# Patient Record
Sex: MALE | Race: WHITE | NOT HISPANIC OR LATINO
[De-identification: names, ages, dates, MRNs, and addresses within clinical notes are randomized per-mention and may not be internally consistent; named-entity substitution may affect disease eponyms.]

---

## 2021-07-13 ENCOUNTER — NON-APPOINTMENT (OUTPATIENT)
Age: 46
End: 2021-07-13

## 2021-07-14 ENCOUNTER — NON-APPOINTMENT (OUTPATIENT)
Age: 46
End: 2021-07-14

## 2021-07-15 ENCOUNTER — APPOINTMENT (OUTPATIENT)
Dept: SURGERY | Facility: CLINIC | Age: 46
End: 2021-07-15
Payer: COMMERCIAL

## 2021-07-15 VITALS
HEIGHT: 71 IN | TEMPERATURE: 97.6 F | SYSTOLIC BLOOD PRESSURE: 130 MMHG | HEART RATE: 59 BPM | DIASTOLIC BLOOD PRESSURE: 79 MMHG | OXYGEN SATURATION: 98 % | BODY MASS INDEX: 32.2 KG/M2 | WEIGHT: 230 LBS

## 2021-07-15 DIAGNOSIS — Z78.9 OTHER SPECIFIED HEALTH STATUS: ICD-10-CM

## 2021-07-15 DIAGNOSIS — Z86.69 PERSONAL HISTORY OF OTHER DISEASES OF THE NERVOUS SYSTEM AND SENSE ORGANS: ICD-10-CM

## 2021-07-15 DIAGNOSIS — Z80.8 FAMILY HISTORY OF MALIGNANT NEOPLASM OF OTHER ORGANS OR SYSTEMS: ICD-10-CM

## 2021-07-15 DIAGNOSIS — Z86.39 PERSONAL HISTORY OF OTHER ENDOCRINE, NUTRITIONAL AND METABOLIC DISEASE: ICD-10-CM

## 2021-07-15 PROCEDURE — 99072 ADDL SUPL MATRL&STAF TM PHE: CPT

## 2021-07-15 PROCEDURE — 99204 OFFICE O/P NEW MOD 45 MIN: CPT

## 2021-07-15 RX ORDER — VENLAFAXINE HCL 50 MG
TABLET ORAL
Refills: 0 | Status: ACTIVE | COMMUNITY

## 2021-07-15 RX ORDER — SIMVASTATIN 80 MG/1
TABLET, FILM COATED ORAL
Refills: 0 | Status: ACTIVE | COMMUNITY

## 2021-07-15 NOTE — ASSESSMENT
[FreeTextEntry1] : All risks, benefits and options discussed including open vs laparoscopic hernia repair. Pt wants to schedule a laparoscopic repair of his inguinal hernia

## 2021-07-15 NOTE — PHYSICAL EXAM
[JVD] : no jugular venous distention  [Normal Thyroid] : the thyroid was normal [Carotid Bruits] : no carotid bruits [Normal Breath Sounds] : Normal breath sounds [Normal Heart Sounds] : normal heart sounds [Normal Rate and Rhythm] : normal rate and rhythm [Stool Sample Taken] : A stool sample was obtained  [Occult Blood Positive] : was negative for occult blood [Gross Blood] : no gross blood [Fecal Impaction] : no fecal impaction [No Rash or Lesion] : No rash or lesion [Alert] : alert [Oriented to Person] : oriented to person [Oriented to Place] : oriented to place [Oriented to Time] : oriented to time [Calm] : calm [de-identified] : well developed white male in no distress [de-identified] : noninjected and nonicteric [de-identified] : without adenopathy [de-identified] : normal bowel sounds, without distension or tenderness [de-identified] : normal testicles, right inguinal hernia, increased size left ring [de-identified] : without masses or tenderness [de-identified] : without calf pain or swelling

## 2021-07-15 NOTE — REVIEW OF SYSTEMS
[Suicidal] : not suicidal [Sleep Disturbances] : no sleep disturbances [Anxiety] : anxiety [Depression] : no depression [Change In Personality] : no personality change [Emotional Problems] : no emotional problems [Negative] : Heme/Lymph [FreeTextEntry9] : has back pain

## 2021-07-15 NOTE — HISTORY OF PRESENT ILLNESS
[de-identified] : right inguinal hernia [de-identified] : 46 year old white male who presents c/o a right inguinal hernia. Pt states that over the last three months he has noticed a lump that comes and goes. He has had mild discomfort in the bertha. He denies any nausea or vomiting or changes in his bowel habits. Denies urinary symptoms.

## 2021-07-19 ENCOUNTER — OUTPATIENT (OUTPATIENT)
Dept: OUTPATIENT SERVICES | Facility: HOSPITAL | Age: 46
LOS: 1 days | End: 2021-07-19
Payer: COMMERCIAL

## 2021-07-19 VITALS
DIASTOLIC BLOOD PRESSURE: 74 MMHG | RESPIRATION RATE: 14 BRPM | HEIGHT: 71 IN | WEIGHT: 220.02 LBS | HEART RATE: 60 BPM | TEMPERATURE: 97 F | OXYGEN SATURATION: 97 % | SYSTOLIC BLOOD PRESSURE: 137 MMHG

## 2021-07-19 DIAGNOSIS — Z98.890 OTHER SPECIFIED POSTPROCEDURAL STATES: Chronic | ICD-10-CM

## 2021-07-19 DIAGNOSIS — K40.90 UNILATERAL INGUINAL HERNIA, WITHOUT OBSTRUCTION OR GANGRENE, NOT SPECIFIED AS RECURRENT: ICD-10-CM

## 2021-07-19 DIAGNOSIS — Z41.9 ENCOUNTER FOR PROCEDURE FOR PURPOSES OTHER THAN REMEDYING HEALTH STATE, UNSPECIFIED: Chronic | ICD-10-CM

## 2021-07-19 DIAGNOSIS — Z01.818 ENCOUNTER FOR OTHER PREPROCEDURAL EXAMINATION: ICD-10-CM

## 2021-07-19 LAB
ANION GAP SERPL CALC-SCNC: 4 MMOL/L — LOW (ref 5–17)
APPEARANCE UR: CLEAR — SIGNIFICANT CHANGE UP
BILIRUB UR-MCNC: NEGATIVE — SIGNIFICANT CHANGE UP
BUN SERPL-MCNC: 27 MG/DL — HIGH (ref 7–23)
CALCIUM SERPL-MCNC: 9.6 MG/DL — SIGNIFICANT CHANGE UP (ref 8.5–10.1)
CHLORIDE SERPL-SCNC: 106 MMOL/L — SIGNIFICANT CHANGE UP (ref 96–108)
CO2 SERPL-SCNC: 32 MMOL/L — HIGH (ref 22–31)
COLOR SPEC: YELLOW — SIGNIFICANT CHANGE UP
CREAT SERPL-MCNC: 1 MG/DL — SIGNIFICANT CHANGE UP (ref 0.5–1.3)
DIFF PNL FLD: NEGATIVE — SIGNIFICANT CHANGE UP
GLUCOSE SERPL-MCNC: 76 MG/DL — SIGNIFICANT CHANGE UP (ref 70–99)
GLUCOSE UR QL: NEGATIVE — SIGNIFICANT CHANGE UP
HCT VFR BLD CALC: 39 % — SIGNIFICANT CHANGE UP (ref 39–50)
HGB BLD-MCNC: 13.1 G/DL — SIGNIFICANT CHANGE UP (ref 13–17)
KETONES UR-MCNC: NEGATIVE — SIGNIFICANT CHANGE UP
LEUKOCYTE ESTERASE UR-ACNC: NEGATIVE — SIGNIFICANT CHANGE UP
MCHC RBC-ENTMCNC: 29.4 PG — SIGNIFICANT CHANGE UP (ref 27–34)
MCHC RBC-ENTMCNC: 33.6 GM/DL — SIGNIFICANT CHANGE UP (ref 32–36)
MCV RBC AUTO: 87.6 FL — SIGNIFICANT CHANGE UP (ref 80–100)
NITRITE UR-MCNC: NEGATIVE — SIGNIFICANT CHANGE UP
NRBC # BLD: 0 /100 WBCS — SIGNIFICANT CHANGE UP (ref 0–0)
PH UR: 5 — SIGNIFICANT CHANGE UP (ref 5–8)
PLATELET # BLD AUTO: 173 K/UL — SIGNIFICANT CHANGE UP (ref 150–400)
POTASSIUM SERPL-MCNC: 3.8 MMOL/L — SIGNIFICANT CHANGE UP (ref 3.5–5.3)
POTASSIUM SERPL-SCNC: 3.8 MMOL/L — SIGNIFICANT CHANGE UP (ref 3.5–5.3)
PROT UR-MCNC: NEGATIVE — SIGNIFICANT CHANGE UP
RBC # BLD: 4.45 M/UL — SIGNIFICANT CHANGE UP (ref 4.2–5.8)
RBC # FLD: 12 % — SIGNIFICANT CHANGE UP (ref 10.3–14.5)
SODIUM SERPL-SCNC: 142 MMOL/L — SIGNIFICANT CHANGE UP (ref 135–145)
SP GR SPEC: 1.02 — SIGNIFICANT CHANGE UP (ref 1.01–1.02)
UROBILINOGEN FLD QL: NEGATIVE — SIGNIFICANT CHANGE UP
WBC # BLD: 7.63 K/UL — SIGNIFICANT CHANGE UP (ref 3.8–10.5)
WBC # FLD AUTO: 7.63 K/UL — SIGNIFICANT CHANGE UP (ref 3.8–10.5)

## 2021-07-19 PROCEDURE — G0463: CPT

## 2021-07-19 PROCEDURE — 93010 ELECTROCARDIOGRAM REPORT: CPT

## 2021-07-19 PROCEDURE — 93005 ELECTROCARDIOGRAM TRACING: CPT

## 2021-07-19 PROCEDURE — 80048 BASIC METABOLIC PNL TOTAL CA: CPT

## 2021-07-19 PROCEDURE — 86901 BLOOD TYPING SEROLOGIC RH(D): CPT

## 2021-07-19 PROCEDURE — 87086 URINE CULTURE/COLONY COUNT: CPT

## 2021-07-19 PROCEDURE — 81003 URINALYSIS AUTO W/O SCOPE: CPT

## 2021-07-19 PROCEDURE — 86900 BLOOD TYPING SEROLOGIC ABO: CPT

## 2021-07-19 PROCEDURE — 85027 COMPLETE CBC AUTOMATED: CPT

## 2021-07-19 PROCEDURE — 36415 COLL VENOUS BLD VENIPUNCTURE: CPT

## 2021-07-19 PROCEDURE — 86850 RBC ANTIBODY SCREEN: CPT

## 2021-07-19 RX ORDER — CHOLECALCIFEROL (VITAMIN D3) 125 MCG
1 CAPSULE ORAL
Qty: 0 | Refills: 0 | DISCHARGE

## 2021-07-19 RX ORDER — OMEGA-3 ACID ETHYL ESTERS 1 G
1 CAPSULE ORAL
Qty: 0 | Refills: 0 | DISCHARGE

## 2021-07-19 NOTE — H&P PST ADULT - NSICDXPASTSURGICALHX_GEN_ALL_CORE_FT
PAST SURGICAL HISTORY:  Elective surgery 1980's Procedure to remove excessive blood vessels from groin    H/O shoulder surgery 2015 - Repair Torn Labrum RIGHT SHoulder    History of back surgery 2008 - Herniated Disc

## 2021-07-19 NOTE — H&P PST ADULT - NEGATIVE ENMT SYMPTOMS
no hearing difficulty/no sinus symptoms/no nasal congestion/no abnormal taste sensation/no dry mouth/no throat pain/no dysphagia

## 2021-07-19 NOTE — H&P PST ADULT - NSICDXPROBLEM_GEN_ALL_CORE_FT
PROBLEM DIAGNOSES  Problem: Unilateral inguinal hernia, without obstruction or gangrene, not specified as recurrent  Assessment and Plan: PST Labs; CBC, BMP, Type & Screen, U.A, Urine Culture, EKG. Medical clearance with PCP Dr Marie Rhodes.Pt has called office brendan appointment, waiting for them to call back with date and time this week. Pt notes he will call office himself again this morning to finalize appointment date and time. Pt instructed to stop any NSAIDS/Herbal Supplements between now and procedure. May take Tylenol if needed for pain between now and procedure. Morning of procedure pt will take his Effexor with small sip of water. Pt has received J&J COVID-19 vaccine therefore he dose not need to have a covid swab prior to procedure. Pre-op instructions as well as pre-op wash instructions given to pt with understanding verbalized. All questions addressed with patient prior to him leaving the PST department.

## 2021-07-19 NOTE — H&P PST ADULT - NSICDXPASTMEDICALHX_GEN_ALL_CORE_FT
PAST MEDICAL HISTORY:  Anxiety     Hypercholesterolemia     GEO on CPAP     Unilateral inguinal hernia, without obstruction or gangrene, not specified as recurrent

## 2021-07-19 NOTE — H&P PST ADULT - PSYCHIATRIC DETAILS
How Severe Are Your Spot(S)?: mild
Have Your Spot(S) Been Treated In The Past?: has not been treated
Hpi Title: Evaluation of Skin Lesions
Location: Left thigh
Year Removed: 2014
normal affect/normal behavior

## 2021-07-19 NOTE — H&P PST ADULT - NSICDXFAMILYHX_GEN_ALL_CORE_FT
FAMILY HISTORY:  Mother  Still living? Yes, Estimated age: 71-80  Family history of malignant melanoma, Age at diagnosis: Age Unknown

## 2021-07-19 NOTE — H&P PST ADULT - ASSESSMENT
46 year old male PMH Hypercholesterolemia, GEO (on CPAP), Anxiety; now with Unilateral Inguinal hernia, without obstruction or gangrene, not specified as recurrent; scheduled for Laparoscopic Repair RIGHT Inguinal hernia on 7/26/2021 with Dr Vic Villarreal.

## 2021-07-19 NOTE — H&P PST ADULT - HISTORY OF PRESENT ILLNESS
46 year old male PMH Hypercholesterolemia, GEO (on CPAP), Anxiety; now with Unilateral Inguinal hernia, without obstruction or gangrene, not specified as recurrent; presents today for PST prior to Laparoscopic Repair RIGHT Inguinal hernia on 7/26/2021 with Dr Vic Villarreal. Pt reports he initially noted the hernia this spring. Notes "lump in right groin and I lye down to push it back in." (Reducible hernia). Denies any N/V, denies any changes in bowel or bladder habits. Pt was seen by his PCP for his annual and it was advised he see a general surgeon. Pt was seen by Dr Vic Villarreal and following consult, exam and discussions regarding treatment options pt is electing for scheduled procedure.    46 year old male PMH Hypercholesterolemia, GEO (on CPAP), Anxiety; now with Unilateral Inguinal hernia, without obstruction or gangrene, not specified as recurrent; presents today for PST prior to Laparoscopic Repair RIGHT Inguinal hernia on 7/26/2021 with Dr Vic Villarreal. Pt reports he initially noted the hernia this spring. Notes "lump in right groin and I lye down to push it back in." (Reducible hernia). Reports he notes it when he is standing for long periods of time and it doing and if doing certain activities. Denies pain at rest however pain with activity or standing for a period of time can become #8/10. Denies use of any pain medication for pain management. Denies any N/V, denies any changes in bowel or bladder habits. Pt was seen by his PCP for his annual and it was advised he see a general surgeon. Pt was seen by Dr Vic Villarreal and following consult, exam and discussions regarding treatment options pt is electing for scheduled procedure.

## 2021-07-20 LAB
CULTURE RESULTS: SIGNIFICANT CHANGE UP
SPECIMEN SOURCE: SIGNIFICANT CHANGE UP

## 2021-07-23 NOTE — ASU PATIENT PROFILE, ADULT - PMH
Anxiety    Hypercholesterolemia    GEO on CPAP    Unilateral inguinal hernia, without obstruction or gangrene, not specified as recurrent

## 2021-07-23 NOTE — ASU PATIENT PROFILE, ADULT - PSH
Elective surgery  1980's Procedure to remove excessive blood vessels from groin  H/O shoulder surgery  2015 - Repair Torn Labrum RIGHT [place]  History of back surgery  2008 - Herniated Disc

## 2021-07-25 ENCOUNTER — TRANSCRIPTION ENCOUNTER (OUTPATIENT)
Age: 46
End: 2021-07-25

## 2021-07-26 ENCOUNTER — OUTPATIENT (OUTPATIENT)
Dept: OUTPATIENT SERVICES | Facility: HOSPITAL | Age: 46
LOS: 1 days | End: 2021-07-26
Payer: COMMERCIAL

## 2021-07-26 ENCOUNTER — APPOINTMENT (OUTPATIENT)
Dept: SURGERY | Facility: HOSPITAL | Age: 46
End: 2021-07-26

## 2021-07-26 ENCOUNTER — RESULT REVIEW (OUTPATIENT)
Age: 46
End: 2021-07-26

## 2021-07-26 VITALS
OXYGEN SATURATION: 99 % | DIASTOLIC BLOOD PRESSURE: 67 MMHG | TEMPERATURE: 98 F | RESPIRATION RATE: 15 BRPM | SYSTOLIC BLOOD PRESSURE: 119 MMHG | HEART RATE: 65 BPM

## 2021-07-26 VITALS
SYSTOLIC BLOOD PRESSURE: 125 MMHG | TEMPERATURE: 98 F | DIASTOLIC BLOOD PRESSURE: 67 MMHG | HEART RATE: 52 BPM | OXYGEN SATURATION: 96 % | RESPIRATION RATE: 16 BRPM

## 2021-07-26 DIAGNOSIS — Z41.9 ENCOUNTER FOR PROCEDURE FOR PURPOSES OTHER THAN REMEDYING HEALTH STATE, UNSPECIFIED: Chronic | ICD-10-CM

## 2021-07-26 DIAGNOSIS — Z98.890 OTHER SPECIFIED POSTPROCEDURAL STATES: Chronic | ICD-10-CM

## 2021-07-26 DIAGNOSIS — K40.90 UNILATERAL INGUINAL HERNIA, WITHOUT OBSTRUCTION OR GANGRENE, NOT SPECIFIED AS RECURRENT: ICD-10-CM

## 2021-07-26 DIAGNOSIS — Z01.818 ENCOUNTER FOR OTHER PREPROCEDURAL EXAMINATION: ICD-10-CM

## 2021-07-26 PROCEDURE — 55520 REMOVAL OF SPERM CORD LESION: CPT

## 2021-07-26 PROCEDURE — 49650 LAP ING HERNIA REPAIR INIT: CPT | Mod: RT

## 2021-07-26 PROCEDURE — 49203: CPT | Mod: 59

## 2021-07-26 PROCEDURE — 49203: CPT | Mod: AS

## 2021-07-26 PROCEDURE — C1781: CPT

## 2021-07-26 PROCEDURE — 88304 TISSUE EXAM BY PATHOLOGIST: CPT | Mod: 26

## 2021-07-26 PROCEDURE — C1889: CPT

## 2021-07-26 PROCEDURE — 88304 TISSUE EXAM BY PATHOLOGIST: CPT

## 2021-07-26 PROCEDURE — 49650 LAP ING HERNIA REPAIR INIT: CPT | Mod: AS

## 2021-07-26 RX ORDER — DOCUSATE SODIUM 100 MG
1 CAPSULE ORAL
Qty: 20 | Refills: 0
Start: 2021-07-26

## 2021-07-26 RX ORDER — ONDANSETRON 8 MG/1
4 TABLET, FILM COATED ORAL ONCE
Refills: 0 | Status: COMPLETED | OUTPATIENT
Start: 2021-07-26 | End: 2021-07-26

## 2021-07-26 RX ORDER — HYDROMORPHONE HYDROCHLORIDE 2 MG/ML
0.5 INJECTION INTRAMUSCULAR; INTRAVENOUS; SUBCUTANEOUS
Refills: 0 | Status: DISCONTINUED | OUTPATIENT
Start: 2021-07-26 | End: 2021-07-26

## 2021-07-26 RX ORDER — IBUPROFEN 200 MG
1 TABLET ORAL
Qty: 20 | Refills: 0
Start: 2021-07-26

## 2021-07-26 RX ORDER — OMEGA-3 ACID ETHYL ESTERS 1 G
2 CAPSULE ORAL
Qty: 0 | Refills: 0 | DISCHARGE

## 2021-07-26 RX ORDER — TAMSULOSIN HYDROCHLORIDE 0.4 MG/1
0.4 CAPSULE ORAL ONCE
Refills: 0 | Status: COMPLETED | OUTPATIENT
Start: 2021-07-26 | End: 2021-07-26

## 2021-07-26 RX ORDER — VENLAFAXINE HCL 75 MG
1 CAPSULE, EXT RELEASE 24 HR ORAL
Qty: 0 | Refills: 0 | DISCHARGE

## 2021-07-26 RX ORDER — OXYCODONE AND ACETAMINOPHEN 5; 325 MG/1; MG/1
1 TABLET ORAL
Qty: 20 | Refills: 0
Start: 2021-07-26

## 2021-07-26 RX ORDER — CEFAZOLIN SODIUM 1 G
2000 VIAL (EA) INJECTION ONCE
Refills: 0 | Status: COMPLETED | OUTPATIENT
Start: 2021-07-26 | End: 2021-07-26

## 2021-07-26 RX ORDER — SODIUM CHLORIDE 9 MG/ML
1000 INJECTION, SOLUTION INTRAVENOUS
Refills: 0 | Status: DISCONTINUED | OUTPATIENT
Start: 2021-07-26 | End: 2021-07-26

## 2021-07-26 RX ORDER — SIMVASTATIN 20 MG/1
1 TABLET, FILM COATED ORAL
Qty: 0 | Refills: 0 | DISCHARGE

## 2021-07-26 RX ORDER — CHOLECALCIFEROL (VITAMIN D3) 125 MCG
2 CAPSULE ORAL
Qty: 0 | Refills: 0 | DISCHARGE

## 2021-07-26 RX ORDER — OXYCODONE HYDROCHLORIDE 5 MG/1
5 TABLET ORAL ONCE
Refills: 0 | Status: DISCONTINUED | OUTPATIENT
Start: 2021-07-26 | End: 2021-07-26

## 2021-07-26 RX ORDER — SODIUM CHLORIDE 9 MG/ML
250 INJECTION INTRAMUSCULAR; INTRAVENOUS; SUBCUTANEOUS ONCE
Refills: 0 | Status: COMPLETED | OUTPATIENT
Start: 2021-07-26 | End: 2021-07-26

## 2021-07-26 RX ADMIN — TAMSULOSIN HYDROCHLORIDE 0.4 MILLIGRAM(S): 0.4 CAPSULE ORAL at 09:27

## 2021-07-26 RX ADMIN — SODIUM CHLORIDE 250 MILLILITER(S): 9 INJECTION INTRAMUSCULAR; INTRAVENOUS; SUBCUTANEOUS at 09:27

## 2021-07-26 RX ADMIN — ONDANSETRON 4 MILLIGRAM(S): 8 TABLET, FILM COATED ORAL at 10:01

## 2021-07-26 RX ADMIN — HYDROMORPHONE HYDROCHLORIDE 0.5 MILLIGRAM(S): 2 INJECTION INTRAMUSCULAR; INTRAVENOUS; SUBCUTANEOUS at 10:19

## 2021-07-26 RX ADMIN — HYDROMORPHONE HYDROCHLORIDE 0.5 MILLIGRAM(S): 2 INJECTION INTRAMUSCULAR; INTRAVENOUS; SUBCUTANEOUS at 10:04

## 2021-07-26 RX ADMIN — SODIUM CHLORIDE 75 MILLILITER(S): 9 INJECTION, SOLUTION INTRAVENOUS at 07:20

## 2021-07-26 NOTE — BRIEF OPERATIVE NOTE - NSICDXBRIEFPROCEDURE_GEN_ALL_CORE_FT
PROCEDURES:  Laparoscopic inguinal hernia repair 26-Jul-2021 09:16:54 right groin Juan Manuel Moura

## 2021-07-26 NOTE — ASU DISCHARGE PLAN (ADULT/PEDIATRIC) - ASU DC SPECIAL INSTRUCTIONSFT
You may remove the clear adhesive and gauze dressings and shower in 48 hours.  Do not remove steri-strips.  Do not let water hit wounds directly, do not rub incisions.      No heavy lifting (nothing heavier than 5 lbs.), no strenuous physical activity, no exercise.      You may perform your usual daily tasks as tolerated.    You may resume your usual daily diet as tolerated.           Do not drive for 24 hours following anesthesia.  Do not drive while taking narcotic pain meds.  Do not drive until pain-free.      Keep your legs elevated as much as possible (with your feet 18-20 inches above your heart) to help prevent pain/throbbing/swelling at surgical site.     Take percocet as prescribed for pain.  Take colace as prescribed while taking narcotic pain medication to prevent constipation.  Take ibuprofen as prescribed for moderate pain - take with food.      Follow-up with Dr. Villarreal in his office next week - call office for appointment if not already made.

## 2021-07-26 NOTE — BRIEF OPERATIVE NOTE - NSICDXBRIEFPREOP_GEN_ALL_CORE_FT
PRE-OP DIAGNOSIS:  Unilateral inguinal hernia, without obstruction or gangrene, not specified as recurrent 26-Jul-2021 09:17:19  Juan Manuel Moura

## 2021-07-26 NOTE — BRIEF OPERATIVE NOTE - NSICDXBRIEFPOSTOP_GEN_ALL_CORE_FT
POST-OP DIAGNOSIS:  Unilateral inguinal hernia, without obstruction or gangrene, not specified as recurrent 26-Jul-2021 09:17:25  Juan Manuel Moura

## 2021-07-26 NOTE — BRIEF OPERATIVE NOTE - OPERATION/FINDINGS
Adhesions of peritoneum in the region of the right inguinal hernia to small bowel and right colon - meticulously taken down.

## 2021-07-26 NOTE — ASU DISCHARGE PLAN (ADULT/PEDIATRIC) - CARE PROVIDER_API CALL
Vic Villarreal)  Surgery  [address] [place] [id number]  Phone: [phone]  Fax: [phone]  Follow Up Time:

## 2021-07-27 PROBLEM — F41.9 ANXIETY DISORDER, UNSPECIFIED: Chronic | Status: ACTIVE | Noted: 2021-07-19

## 2021-07-27 PROBLEM — G47.33 OBSTRUCTIVE SLEEP APNEA (ADULT) (PEDIATRIC): Chronic | Status: ACTIVE | Noted: 2021-07-19

## 2021-07-27 PROBLEM — K40.90 UNILATERAL INGUINAL HERNIA, WITHOUT OBSTRUCTION OR GANGRENE, NOT SPECIFIED AS RECURRENT: Chronic | Status: ACTIVE | Noted: 2021-07-19

## 2021-07-27 PROBLEM — E78.00 PURE HYPERCHOLESTEROLEMIA, UNSPECIFIED: Chronic | Status: ACTIVE | Noted: 2021-07-19

## 2021-07-27 LAB — SURGICAL PATHOLOGY STUDY: SIGNIFICANT CHANGE UP

## 2021-07-29 ENCOUNTER — APPOINTMENT (OUTPATIENT)
Dept: SURGERY | Facility: CLINIC | Age: 46
End: 2021-07-29
Payer: COMMERCIAL

## 2021-07-29 VITALS — TEMPERATURE: 97.2 F

## 2021-07-29 PROCEDURE — 99024 POSTOP FOLLOW-UP VISIT: CPT

## 2021-07-29 NOTE — PHYSICAL EXAM
[Normal Breath Sounds] : Normal breath sounds [Normal Heart Sounds] : normal heart sounds [Normal Rate and Rhythm] : normal rate and rhythm [Calm] : calm [de-identified] : well developed male in no acute distress [de-identified] : normal bowel sounds, without distension or tenderness\par Incision clean and closed [de-identified] : repair intact without swelling [de-identified] : without calf pain or swelling

## 2021-07-29 NOTE — HISTORY OF PRESENT ILLNESS
[de-identified] : s/p laparoscopic repair of right inguinal hernia on 7/26/21 [de-identified] : Pt with soreness and constipation, no fevers or chills. Slowly improving

## 2021-08-05 ENCOUNTER — NON-APPOINTMENT (OUTPATIENT)
Age: 46
End: 2021-08-05

## 2021-08-19 ENCOUNTER — APPOINTMENT (OUTPATIENT)
Dept: SURGERY | Facility: CLINIC | Age: 46
End: 2021-08-19
Payer: COMMERCIAL

## 2021-08-19 VITALS — TEMPERATURE: 98.2 F

## 2021-08-19 DIAGNOSIS — K40.90 UNILATERAL INGUINAL HERNIA, W/OUT OBSTRUCTION OR GANGRENE, NOT SPECIFIED AS RECURRENT: ICD-10-CM

## 2021-08-19 PROCEDURE — 99024 POSTOP FOLLOW-UP VISIT: CPT

## 2021-08-19 NOTE — PHYSICAL EXAM
[Normal Breath Sounds] : Normal breath sounds [Normal Rate and Rhythm] : normal rate and rhythm [No Rash or Lesion] : No rash or lesion [Calm] : calm [de-identified] : well developed white male in no distress. [de-identified] : noninjected and nonicteric [de-identified] : normal bowel sounds, without distension or tenderness.\par Incisions clean and closed. [de-identified] : repair intact, without swelling [de-identified] : without calf pain or swelling

## 2021-08-19 NOTE — HISTORY OF PRESENT ILLNESS
[de-identified] : s/p laparoscopic repair of right inguinal hernia on 7/26/21 [de-identified] : pt improved, decreased pain, normal GI functioning, Denies any fevers or chills, voiding without difficulty. Pt does have testicular sensitivity but it is improving.

## 2022-03-10 ENCOUNTER — APPOINTMENT (OUTPATIENT)
Dept: SURGERY | Facility: CLINIC | Age: 47
End: 2022-03-10
Payer: COMMERCIAL

## 2022-03-10 VITALS
HEART RATE: 65 BPM | BODY MASS INDEX: 30.8 KG/M2 | SYSTOLIC BLOOD PRESSURE: 147 MMHG | WEIGHT: 220 LBS | OXYGEN SATURATION: 97 % | DIASTOLIC BLOOD PRESSURE: 87 MMHG | HEIGHT: 71 IN | TEMPERATURE: 97.2 F

## 2022-03-10 PROCEDURE — 99212 OFFICE O/P EST SF 10 MIN: CPT

## 2022-03-10 NOTE — PHYSICAL EXAM
[Normal Breath Sounds] : Normal breath sounds [Normal Heart Sounds] : normal heart sounds [Normal Rate and Rhythm] : normal rate and rhythm [Calm] : calm [de-identified] : well developed white male in no acute distress [de-identified] : normal bowel sounds, without distension or tenderness.\par Incisions clean and closed. [de-identified] : right sided repair intact without lumps or swelling [de-identified] : without calf pain or swelling

## 2022-03-10 NOTE — HISTORY OF PRESENT ILLNESS
[de-identified] : s/p laparoscopic repair of right inguinal hernia on 7/26/21 [de-identified] : pt was doing well until December when he got covid and developed right groin soreness. His pain did not resolve. He does not have any lumps in his right groin.

## 2022-04-07 ENCOUNTER — APPOINTMENT (OUTPATIENT)
Dept: SURGERY | Facility: CLINIC | Age: 47
End: 2022-04-07
Payer: COMMERCIAL

## 2022-04-07 VITALS — TEMPERATURE: 97.2 F

## 2022-04-07 PROCEDURE — 99212 OFFICE O/P EST SF 10 MIN: CPT

## 2022-04-07 RX ORDER — MELOXICAM 15 MG/1
15 TABLET ORAL DAILY
Qty: 30 | Refills: 3 | Status: ACTIVE | COMMUNITY
Start: 2022-04-07 | End: 1900-01-01

## 2022-04-07 NOTE — HISTORY OF PRESENT ILLNESS
[de-identified] : s/p laparoscopic repair of right inguinal hernia on 7/26/21 [de-identified] : Pts pain has decreased. He has normal GI functioning and no fevers or chills.

## 2022-04-07 NOTE — PHYSICAL EXAM
[Normal Breath Sounds] : Normal breath sounds [Normal Heart Sounds] : normal heart sounds [Calm] : calm [de-identified] : well developed male in no distress [de-identified] : benign [de-identified] : repair in tact, without swelling [de-identified] : without calf pain or swelling

## 2022-05-26 ENCOUNTER — APPOINTMENT (OUTPATIENT)
Dept: SURGERY | Facility: CLINIC | Age: 47
End: 2022-05-26
Payer: COMMERCIAL

## 2022-05-26 VITALS — TEMPERATURE: 97.9 F

## 2022-05-26 DIAGNOSIS — R10.31 RIGHT LOWER QUADRANT PAIN: ICD-10-CM

## 2022-05-26 PROCEDURE — 99212 OFFICE O/P EST SF 10 MIN: CPT

## 2022-05-26 NOTE — HISTORY OF PRESENT ILLNESS
[de-identified] : s/p laparoscopic repair of right inguinal hernia on 7/26/21 [de-identified] : pt had increased pain and was started on NSAID on his last visit. Pt states he is improved, He denies any swelling in his groin, Normal GI functioning,.

## 2022-05-26 NOTE — PHYSICAL EXAM
[Normal Breath Sounds] : Normal breath sounds [Normal Heart Sounds] : normal heart sounds [Normal Rate and Rhythm] : normal rate and rhythm [Calm] : calm [de-identified] : well developed white male in no distress [de-identified] : normal bowel sounds, without distension or tenderness.\par Incisions clean and closed [de-identified] : repair intact, without swelling [de-identified] : without calf pain or swelling

## 2023-04-25 NOTE — ASU PATIENT PROFILE, ADULT - PAIN CHRONIC, PROFILE
Spoke to patient, states he did blood work this morning. Updated remind me to be sure we get results.   no

## 2024-05-26 ENCOUNTER — EMERGENCY (EMERGENCY)
Facility: HOSPITAL | Age: 49
LOS: 1 days | Discharge: HOME | End: 2024-05-26
Attending: EMERGENCY MEDICINE | Admitting: EMERGENCY MEDICINE
Payer: COMMERCIAL

## 2024-05-26 VITALS
SYSTOLIC BLOOD PRESSURE: 133 MMHG | OXYGEN SATURATION: 99 % | DIASTOLIC BLOOD PRESSURE: 84 MMHG | HEART RATE: 66 BPM | WEIGHT: 229.94 LBS | TEMPERATURE: 98 F | RESPIRATION RATE: 18 BRPM

## 2024-05-26 VITALS
RESPIRATION RATE: 16 BRPM | HEART RATE: 62 BPM | OXYGEN SATURATION: 97 % | DIASTOLIC BLOOD PRESSURE: 82 MMHG | TEMPERATURE: 99 F | SYSTOLIC BLOOD PRESSURE: 136 MMHG

## 2024-05-26 DIAGNOSIS — Z98.890 OTHER SPECIFIED POSTPROCEDURAL STATES: Chronic | ICD-10-CM

## 2024-05-26 DIAGNOSIS — Z41.9 ENCOUNTER FOR PROCEDURE FOR PURPOSES OTHER THAN REMEDYING HEALTH STATE, UNSPECIFIED: Chronic | ICD-10-CM

## 2024-05-26 PROCEDURE — 12011 RPR F/E/E/N/L/M 2.5 CM/<: CPT

## 2024-05-26 PROCEDURE — 99282 EMERGENCY DEPT VISIT SF MDM: CPT | Mod: 25

## 2024-05-26 PROCEDURE — 99284 EMERGENCY DEPT VISIT MOD MDM: CPT | Mod: 25

## 2024-05-26 RX ORDER — BACITRACIN ZINC 500 UNIT/G
1 OINTMENT IN PACKET (EA) TOPICAL ONCE
Refills: 0 | Status: COMPLETED | OUTPATIENT
Start: 2024-05-26 | End: 2024-05-26

## 2024-05-26 RX ORDER — LIDOCAINE HCL 20 MG/ML
10 VIAL (ML) INJECTION ONCE
Refills: 0 | Status: COMPLETED | OUTPATIENT
Start: 2024-05-26 | End: 2024-05-26

## 2024-05-26 RX ORDER — POVIDONE-IODINE 5 %
1 AEROSOL (ML) TOPICAL ONCE
Refills: 0 | Status: COMPLETED | OUTPATIENT
Start: 2024-05-26 | End: 2024-05-26

## 2024-05-26 RX ADMIN — Medication 1 APPLICATION(S): at 12:07

## 2024-05-26 RX ADMIN — Medication 10 MILLILITER(S): at 12:08

## 2024-05-26 RX ADMIN — Medication 1 APPLICATION(S): at 12:08

## 2024-05-26 NOTE — ED PROVIDER NOTE - DIFFERENTIAL DIAGNOSIS
Differential Diagnosis Differentials include but not limited to laceration, abrasion.  No evidence of midline border involvement.  No evidence of dental injury or jaw injury
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
## 2024-05-26 NOTE — ED PROVIDER NOTE - PATIENT PORTAL LINK FT
You can access the FollowMyHealth Patient Portal offered by [facility] by registering at the following website: [email or web address]. By joining [facility]’s FollowMyHealth portal, you will also be able to view your health information using other applications (apps) compatible with our system.

## 2024-05-26 NOTE — ED PROVIDER NOTE - NSFOLLOWUPINSTRUCTIONS_ED_ALL_ED_FT
Apply thin layer of bacitracin 1-2 times a day  Sutures will dissolve on their own in 1 to 2 weeks      Mouth Laceration  A mouth laceration is a deep cut inside your mouth. The cut may go into your lip or go all the way through your mouth and cheek. The cut may also affect your tongue, the insides of your cheeks, or the upper surface of your mouth (palate). Mouth lacerations may bleed a lot.    What are the causes?  This injury often happens when your teeth cut the lining of your mouth. It may also result from an injury to your face.    What are the signs or symptoms?  Bleeding. This is the most common symptom.  Pain.  Feeling a hole or tear in your mouth.  How is this treated?  Small cuts in the mouth may not need treatment if bleeding has stopped.  Stitches (sutures) may be needed for cuts that are large or deep.  Stitches may be needed for cuts that keep bleeding.  You may also receive antibiotic medicine or a tetanus shot.  Follow these instructions at home:  Medicines    Take over-the-counter and prescription medicines only as told by your doctor.  If you were given an antibiotic, take or apply it as told by your doctor. Do not stop using the antibiotic even if you start to feel better.  Ask your doctor if you should avoid driving or using machines while you are taking your medicine.  Eating and drinking    A diet of soft foods, including applesauce, yogurt, ice cream, and a smoothie.  Eat a soft diet.  Avoid hot foods and hot liquids for a few days as told by your doctor.  Rinse your mouth after eating.  Wound care    It is normal to have a white or gray patch over your wound while it heals.  Check your wound every day for signs of infection. Check for:  Redness, swelling, or pain.  Fluid or blood.  Warmth.  Pus or a bad smell.  Gently gargle and rinse your mouth 4 to 6 times a day. Spit out the liquid. Do not swallow.  Use the rinse solution as told by your doctor. The most used types of rinse include:  A rinse that kills bacteria (chlorhexidine).  Saline rinse.  Do not poke the stitches with your tongue. Doing that can loosen them.  If you have a cut on your lip:  Keep the wound fully dry for the first 24 hours, or as told by your doctor. After that time, you may take a shower or a bath. Do not soak in water until after the stitches have been taken out.  If you were given a bandage, change it at least once a day, or as told by your doctor. You should also change it if it gets wet or dirty.  Clean the wound once a day, or as told by your doctor.  After you clean the wound, put a thin layer of antibiotic ointment on it as told by your doctor. This ointment:  Helps to prevent infection.  Keeps the bandage from sticking to the wound.  General instructions    Using a toothbrush to brush the front and back sides of the teeth.  Keep your mouth and teeth clean. Gently brush your teeth with a soft toothbrush 2 times a day.  Do not smoke or use any products that contain nicotine or tobacco. If you need help quitting, ask your doctor.  Keep all follow-up visits.  Contact a doctor if:  Medicine does not help your pain.  You have a fever or chills.  You have redness, swelling, or pain on your wound that is getting worse.  You have fresh bleeding or pus coming from your wound.  You have swollen or tender glands in your throat.  Get help right away if:  The edges of your wound break open.  Your face or the area under your jaw gets swollen.  You have trouble breathing or swallowing.  These symptoms may be an emergency. Get help right away. Call 911.  Do not wait to see if the symptoms will go away.  Do not drive yourself to the hospital.  Summary  A mouth laceration is a deep cut inside your mouth.  Mouth lacerations may bleed a lot and may need to be treated with stitches.  Check your wound every day for signs of infection.  Contact a doctor if you have fresh bleeding or you notice that pus is coming out of your wound.  This information is not intended to replace advice given to you by your health care provider. Make sure you discuss any questions you have with your health care provider.

## 2024-05-26 NOTE — ED PROVIDER NOTE - CARE PROVIDER_API CALL
Marie Burdick  [facility]  [address] [phone]  Phone: [phone]  Fax: [phone]  Follow Up Time: Routine

## 2024-05-26 NOTE — ED ADULT NURSE NOTE - NSFALLRISKINTERV_ED_ALL_ED
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
